# Patient Record
Sex: FEMALE | Race: WHITE | NOT HISPANIC OR LATINO | URBAN - METROPOLITAN AREA
[De-identification: names, ages, dates, MRNs, and addresses within clinical notes are randomized per-mention and may not be internally consistent; named-entity substitution may affect disease eponyms.]

---
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
## 2023-04-23 ENCOUNTER — OFFICE VISIT (OUTPATIENT)
Dept: URGENT CARE | Facility: CLINIC | Age: 18
End: 2023-04-23
Payer: COMMERCIAL

## 2023-04-23 VITALS
DIASTOLIC BLOOD PRESSURE: 64 MMHG | HEIGHT: 68 IN | OXYGEN SATURATION: 99 % | RESPIRATION RATE: 16 BRPM | TEMPERATURE: 98.9 F | WEIGHT: 115 LBS | HEART RATE: 85 BPM | BODY MASS INDEX: 17.43 KG/M2 | SYSTOLIC BLOOD PRESSURE: 102 MMHG

## 2023-04-23 DIAGNOSIS — S13.4XXA WHIPLASH INJURY TO NECK, INITIAL ENCOUNTER: ICD-10-CM

## 2023-04-23 DIAGNOSIS — S06.0X0A CONCUSSION WITHOUT LOSS OF CONSCIOUSNESS, INITIAL ENCOUNTER: ICD-10-CM

## 2023-04-23 PROCEDURE — 99214 OFFICE O/P EST MOD 30 MIN: CPT | Performed by: PHYSICIAN ASSISTANT

## 2023-04-23 RX ORDER — CYCLOBENZAPRINE HCL 5 MG
5-10 TABLET ORAL
Qty: 15 TABLET | Refills: 0 | Status: SHIPPED | OUTPATIENT
Start: 2023-04-23 | End: 2023-05-01

## 2023-04-23 ASSESSMENT — ENCOUNTER SYMPTOMS
FEVER: 0
COUGH: 0
NAUSEA: 0
SHORTNESS OF BREATH: 0
ABDOMINAL PAIN: 0
SORE THROAT: 0
EYE PAIN: 0
DIARRHEA: 0
MYALGIAS: 0
CONSTIPATION: 0
CHILLS: 0
VOMITING: 0
HEADACHES: 1

## 2023-04-23 ASSESSMENT — FIBROSIS 4 INDEX: FIB4 SCORE: 0.31
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
## 2023-05-01 ENCOUNTER — HOSPITAL ENCOUNTER (EMERGENCY)
Facility: MEDICAL CENTER | Age: 18
End: 2023-05-01
Attending: EMERGENCY MEDICINE
Payer: COMMERCIAL

## 2023-05-01 ENCOUNTER — OFFICE VISIT (OUTPATIENT)
Dept: URGENT CARE | Facility: CLINIC | Age: 18
End: 2023-05-01
Payer: COMMERCIAL

## 2023-05-01 ENCOUNTER — HOSPITAL ENCOUNTER (OUTPATIENT)
Dept: RADIOLOGY | Facility: MEDICAL CENTER | Age: 18
End: 2023-05-01
Attending: EMERGENCY MEDICINE

## 2023-05-01 VITALS
BODY MASS INDEX: 19.62 KG/M2 | DIASTOLIC BLOOD PRESSURE: 78 MMHG | RESPIRATION RATE: 16 BRPM | TEMPERATURE: 98.3 F | SYSTOLIC BLOOD PRESSURE: 120 MMHG | HEIGHT: 67 IN | HEART RATE: 63 BPM | OXYGEN SATURATION: 99 % | WEIGHT: 125 LBS

## 2023-05-01 VITALS
HEIGHT: 67 IN | SYSTOLIC BLOOD PRESSURE: 118 MMHG | TEMPERATURE: 97.7 F | HEART RATE: 78 BPM | DIASTOLIC BLOOD PRESSURE: 66 MMHG | OXYGEN SATURATION: 95 % | BODY MASS INDEX: 19.72 KG/M2 | WEIGHT: 125.66 LBS | RESPIRATION RATE: 16 BRPM

## 2023-05-01 DIAGNOSIS — M79.662 PAIN AND SWELLING OF LOWER LEG, LEFT: ICD-10-CM

## 2023-05-01 DIAGNOSIS — M79.605 LEFT LEG PAIN: ICD-10-CM

## 2023-05-01 DIAGNOSIS — M79.89 PAIN AND SWELLING OF LOWER LEG, LEFT: ICD-10-CM

## 2023-05-01 PROCEDURE — 93971 EXTREMITY STUDY: CPT | Mod: LT

## 2023-05-01 PROCEDURE — 99283 EMERGENCY DEPT VISIT LOW MDM: CPT

## 2023-05-01 PROCEDURE — 99215 OFFICE O/P EST HI 40 MIN: CPT | Performed by: NURSE PRACTITIONER

## 2023-05-01 ASSESSMENT — ENCOUNTER SYMPTOMS
DIZZINESS: 0
MYALGIAS: 0
CHILLS: 0
VOMITING: 0
SHORTNESS OF BREATH: 0
FEVER: 0
SORE THROAT: 0
EYE PAIN: 0
NAUSEA: 0

## 2023-05-01 ASSESSMENT — FIBROSIS 4 INDEX
FIB4 SCORE: 0.31
FIB4 SCORE: 0.31
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
## 2023-05-02 NOTE — ED NOTES
Received bedside report; assumed care of Pt.    No oxygen.  No SI precautions.  Low fall risk precautions.    Introduced self to Pt; informed her that I am part of her care team.  Rounded on Pt. Updated Pt on plan of care. Pt verbalized understanding.  No acute distress at this time.  Will continue to monitor.

## 2023-05-02 NOTE — PROGRESS NOTES
Subjective:   Marina Beltran is a 17 y.o. female who presents for Leg Pain (Patient states L calve pain, swollen, hurts when walking, charley horse pain patient states on-going started today)      HPI  Patient is a 17-year-old female presents urgent care for evaluation of left lower calf pain with swelling that significantly worsened today.  Patient states that she does usually have asymmetrical lower calfs left greater than the right however over the past day it has been more significantly noticeable.  She is also had a sharp cramping pain in her calf feeling like a charley horse that is wax and wane over the day.  She denies any fever or chills, denies any chest pain or shortness of breath, no cough.  Denies any history of blood clots.  Has a history of seizures.  Has not tried any medication for the symptoms.  Denies any acute injury to the left lower leg.  Review of Systems   Constitutional:  Negative for chills and fever.   HENT:  Negative for sore throat.    Eyes:  Negative for pain.   Respiratory:  Negative for shortness of breath.    Cardiovascular:  Negative for chest pain.   Gastrointestinal:  Negative for nausea and vomiting.   Genitourinary:  Negative for hematuria.   Musculoskeletal:  Negative for myalgias.        Left lower leg pain and swelling   Skin:  Negative for rash.   Neurological:  Negative for dizziness.     Medications:    epinephrine    Allergies: Bee    Problem List: Marina Beltran does not have any pertinent problems on file.    Surgical History:  No past surgical history on file.    Past Social Hx: Marina Beltran  reports that she has never smoked. She has never used smokeless tobacco. She reports that she does not drink alcohol and does not use drugs.     Past Family Hx:  Marina Beltran family history is not on file.     Problem list, medications, and allergies reviewed by myself today in Epic.     Objective:     /78 (BP Location: Left arm, Patient Position:  "Sitting, BP Cuff Size: Large adult)   Pulse 63   Temp 36.8 °C (98.3 °F)   Resp 16   Ht 1.702 m (5' 7\")   Wt 56.7 kg (125 lb)   SpO2 99%   BMI 19.58 kg/m²     Physical Exam  Constitutional:       Appearance: Normal appearance. She is not ill-appearing or toxic-appearing.   HENT:      Head: Normocephalic.      Right Ear: External ear normal.      Left Ear: External ear normal.      Nose: Nose normal.      Mouth/Throat:      Lips: Pink.   Eyes:      General: Lids are normal.   Pulmonary:      Effort: Pulmonary effort is normal. No accessory muscle usage.   Musculoskeletal:      Cervical back: Full passive range of motion without pain.      Right lower leg: Normal.      Left lower leg: Swelling and tenderness present.      Right foot: Normal.      Left foot: Normal.        Legs:       Comments: Homans' sign positive,    Neurological:      Mental Status: She is alert and oriented to person, place, and time.   Psychiatric:         Mood and Affect: Mood normal.         Thought Content: Thought content normal.       Assessment/Plan:     Diagnosis and associated orders:     1. Pain and swelling of lower leg, left           Comments/MDM:     At this time, I feel the patient requires a higher level of care including closer monitoring, stat lab work and/or imaging for further evaluation.  Differentials include but not limited to DVT, calf strain this has been discussed with the patient and they state agreement and understanding.  I offered the patient an ambulance ride and  the patient is declining at this time. The patient is in no acute distress upon clinic departure and will go directly to ED without delay.                Please note that this dictation was created using voice recognition software. I have made a reasonable attempt to correct obvious errors, but I expect that there are errors of grammar and possibly content that I did not discover before finalizing the note.    This note was electronically signed by " Earl YUN

## 2023-05-02 NOTE — ED NOTES
Patient educated on discharge instructions, follow up appointments, prescriptions, and home care. Patient verbalized understanding. Patient ambulated well to [facility].

## 2023-05-02 NOTE — DISCHARGE INSTRUCTIONS
Utilize Motrin and Tylenol as needed for pain control.  Keep your left lower extremity elevated.  Return in 5 to 7 days if you are not better and sooner if worse.

## 2023-05-02 NOTE — ED PROVIDER NOTES
"ED Provider Note    CHIEF COMPLAINT  Chief Complaint   Patient presents with    Leg Pain     C/o left leg pain, left leg appears to be more swollen than right side. Pt said it hurt so bad that she couldn't walk for a hour (this happened twice today).         HPI/ROS    Marina Beltran is a 17 y.o. female who presents with left leg pain.  The patient states she noted this earlier today.  She states her left calf is bigger than her right calf.  She states she had 2 spells of severe cramping pain in the left popliteal region extending into the calf.  She does not have any history of a DVT.  She has not any recent immobilization.  She does not take birth control tablets.  She does not have any chest pain or difficulty with breathing.  She has not had any associated fevers.    PAST MEDICAL HISTORY   has a past medical history of Seasonal allergies and Seizure (HCC).    SURGICAL HISTORY  patient denies any surgical history    FAMILY HISTORY  History reviewed. No pertinent family history.    SOCIAL HISTORY  Social History     Tobacco Use    Smoking status: Never    Smokeless tobacco: Never   Vaping Use    Vaping Use: Never used   Substance and Sexual Activity    Alcohol use: No    Drug use: No    Sexual activity: Not on file       CURRENT MEDICATIONS  Home Medications       Reviewed by Magnolia Martinez R.N. (Registered Nurse) on 05/01/23 at 2039  Med List Status: Not Addressed     Medication Last Dose Status   EPINEPHRINE HCL (ANAPHYLAXIS) 0.15 MG/0.3ML (1:2000) IM DEBBIE  Active                    ALLERGIES  Allergies   Allergen Reactions    Bee        PHYSICAL EXAM  VITAL SIGNS: BP (!) 142/65   Pulse 63   Temp 36.6 °C (97.9 °F) (Temporal)   Resp 16   Ht 1.702 m (5' 7\")   Wt 57 kg (125 lb 10.6 oz)   SpO2 98%   BMI 19.68 kg/m²    In general the patient does not appear toxic    Pulmonary chest clear to auscultation bilaterally    Cardiovascular S1-S2 with a regular rate and rhythm    GI abdomen soft    Skin no " pallor nor cyanosis    Extremities patient does have some fullness and tenderness to the left calf with good distal pulses    Neurologic examination is grossly intact      RADIOLOGY  Ultrasound shows no evidence of a DVT  COURSE & MEDICAL DECISION MAKING  This a 17-year-old female who presents the emerged part with left calf pain and swelling.  Ultrasound does not show any evidence of a DVT.  She does not have any skeletal deformities.  I suspect this can be from a muscular source.  The patient will therefore be treated with anti-inflammatories and elevation.  I would like her to recheck in 5 to 7 days and sooner if worse.    FINAL DIAGNOSIS  Left calf pain and swelling    Disposition  The patient will be discharged in stable condition       Electronically signed by: Montez Corey M.D., 5/1/2023 9:11 PM

## 2024-09-13 ENCOUNTER — HOSPITAL ENCOUNTER (OUTPATIENT)
Dept: RADIOLOGY | Facility: MEDICAL CENTER | Age: 19
End: 2024-09-13
Attending: NURSE PRACTITIONER
Payer: COMMERCIAL

## 2024-09-13 DIAGNOSIS — G40.409 OTHER GENERALIZED EPILEPSY AND EPILEPTIC SYNDROMES, NOT INTRACTABLE, WITHOUT STATUS EPILEPTICUS (HCC): ICD-10-CM

## 2024-09-13 DIAGNOSIS — G43.009 MIGRAINE WITHOUT AURA AND WITHOUT STATUS MIGRAINOSUS, NOT INTRACTABLE: ICD-10-CM

## 2024-09-13 PROCEDURE — 70551 MRI BRAIN STEM W/O DYE: CPT
